# Patient Record
Sex: MALE | Race: BLACK OR AFRICAN AMERICAN | NOT HISPANIC OR LATINO | URBAN - METROPOLITAN AREA
[De-identification: names, ages, dates, MRNs, and addresses within clinical notes are randomized per-mention and may not be internally consistent; named-entity substitution may affect disease eponyms.]

---

## 2018-02-08 NOTE — PATIENT DISCUSSION
Patient understands condition, prognosis and need for follow up care.  B-scan done today to further eval Nevus.

## 2018-02-08 NOTE — PATIENT DISCUSSION
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  No tx rec, poss role for future VF if notice new blind spot or symptoms. B-scan confirmed.

## 2018-03-22 ENCOUNTER — IMPORTED ENCOUNTER (OUTPATIENT)
Dept: URBAN - METROPOLITAN AREA CLINIC 43 | Facility: CLINIC | Age: 83
End: 2018-03-22

## 2018-03-22 PROBLEM — H25.011: Noted: 2018-03-22

## 2018-03-22 PROBLEM — H43.812: Noted: 2018-03-22

## 2018-03-22 PROBLEM — H25.042: Noted: 2018-03-22

## 2018-03-22 PROBLEM — H40.1234: Noted: 2018-03-22

## 2018-03-22 PROBLEM — H35.362: Noted: 2018-03-22

## 2018-03-22 PROBLEM — H25.13: Noted: 2018-03-22

## 2019-04-15 ENCOUNTER — IMPORTED ENCOUNTER (OUTPATIENT)
Dept: URBAN - METROPOLITAN AREA CLINIC 43 | Facility: CLINIC | Age: 84
End: 2019-04-15

## 2019-04-15 PROBLEM — H25.013: Noted: 2019-04-15

## 2019-04-15 PROBLEM — H43.813: Noted: 2019-04-15

## 2019-04-15 PROBLEM — H40.1234: Noted: 2019-04-15

## 2019-04-15 PROBLEM — H25.043: Noted: 2019-04-15

## 2019-08-28 ENCOUNTER — IMPORTED ENCOUNTER (OUTPATIENT)
Dept: URBAN - METROPOLITAN AREA CLINIC 43 | Facility: CLINIC | Age: 84
End: 2019-08-28

## 2019-09-23 ENCOUNTER — IMPORTED ENCOUNTER (OUTPATIENT)
Dept: URBAN - METROPOLITAN AREA CLINIC 43 | Facility: CLINIC | Age: 84
End: 2019-09-23

## 2020-04-18 ASSESSMENT — VISUAL ACUITY
OD_SC: 20/200
OS_CC: J1
OD_OTHER: 20/60.
OS_SC: 20/100
OD_SC: 20/60
OS_CC: J1
OD_CC: 20/40
OS_SC: J16
OS_OTHER: 20/60.
OS_SC: 20/50
OD_CC: 20/40-2
OS_CC: 20/30
OD_SC: J16
OD_CC: J1
OD_OTHER: 20/70.
OS_OTHER: 20/60.
OD_CC: J1
OS_CC: 20/30-2

## 2020-04-18 ASSESSMENT — KERATOMETRY
OD_AXISANGLE2_DEGREES: 100
OS_AXISANGLE_DEGREES: 165
OS_K1POWER_DIOPTERS: 40.5
OS_K2POWER_DIOPTERS: 41.5
OD_K2POWER_DIOPTERS: 41.5
OD_AXISANGLE_DEGREES: 10
OS_AXISANGLE2_DEGREES: 75
OD_K1POWER_DIOPTERS: 40.25

## 2020-04-18 ASSESSMENT — TONOMETRY
OS_IOP_MMHG: 13.0
OD_IOP_MMHG: 11.0
OD_IOP_MMHG: 10.0
OS_IOP_MMHG: 12.0

## 2022-02-04 ENCOUNTER — TELEPHONE (OUTPATIENT)
Dept: SCHEDULING | Age: 87
End: 2022-02-04

## 2022-02-05 ENCOUNTER — OFFICE VISIT (OUTPATIENT)
Dept: URGENT CARE | Age: 87
End: 2022-02-05

## 2022-02-05 VITALS
RESPIRATION RATE: 16 BRPM | DIASTOLIC BLOOD PRESSURE: 68 MMHG | OXYGEN SATURATION: 99 % | HEART RATE: 78 BPM | SYSTOLIC BLOOD PRESSURE: 116 MMHG

## 2022-02-05 DIAGNOSIS — H61.21 EXCESSIVE CERUMEN IN RIGHT EAR CANAL: Primary | ICD-10-CM

## 2022-02-05 PROCEDURE — 69210 REMOVE IMPACTED EAR WAX UNI: CPT | Performed by: NURSE PRACTITIONER

## 2022-02-05 ASSESSMENT — ENCOUNTER SYMPTOMS
HEADACHES: 0
VOMITING: 0
SORE THROAT: 0
DIARRHEA: 0
RHINORRHEA: 0
COUGH: 0
ABDOMINAL PAIN: 0

## 2022-06-04 ENCOUNTER — TELEPHONE ENCOUNTER (OUTPATIENT)
Dept: URBAN - METROPOLITAN AREA CLINIC 68 | Facility: CLINIC | Age: 87
End: 2022-06-04

## 2022-06-04 RX ORDER — AMOXICILLIN 500 MG/1
CAPSULE ORAL
Qty: 40 | Refills: 0 | OUTPATIENT
Start: 2016-05-05 | End: 2016-05-15

## 2022-06-04 RX ORDER — CLARITHROMYCIN 500 MG/1
TABLET, FILM COATED ORAL
Qty: 20 | Refills: 0 | OUTPATIENT
Start: 2016-05-05 | End: 2016-05-15

## 2022-06-05 ENCOUNTER — TELEPHONE ENCOUNTER (OUTPATIENT)
Dept: URBAN - METROPOLITAN AREA CLINIC 68 | Facility: CLINIC | Age: 87
End: 2022-06-05

## 2022-06-05 RX ORDER — EZETIMIBE 10 MG/1
ZETIA( 10MG ORAL  DAILY ) ACTIVE -HX ENTRY TABLET ORAL DAILY
Status: ACTIVE | COMMUNITY
Start: 2016-05-05

## 2022-06-05 RX ORDER — ASPIRIN 81 MG/1
ASPIRIN EC( 81MG ORAL  DAILY ) ACTIVE -HX ENTRY TABLET ORAL DAILY
Status: ACTIVE | COMMUNITY
Start: 2016-05-05

## 2022-06-25 ENCOUNTER — TELEPHONE ENCOUNTER (OUTPATIENT)
Age: 87
End: 2022-06-25

## 2022-06-25 RX ORDER — AMOXICILLIN 500 MG/1
CAPSULE ORAL
Qty: 40 | Refills: 0 | OUTPATIENT
Start: 2016-05-05 | End: 2016-05-15

## 2022-06-25 RX ORDER — CLARITHROMYCIN 500 MG/1
TABLET ORAL
Qty: 20 | Refills: 0 | OUTPATIENT
Start: 2016-05-05 | End: 2016-05-15

## 2022-06-25 RX ORDER — SODIUM SULFATE, POTASSIUM SULFATE, MAGNESIUM SULFATE 17.5; 3.13; 1.6 G/ML; G/ML; G/ML
SOLUTION, CONCENTRATE ORAL AS DIRECTED
Qty: 1 | Refills: 0 | OUTPATIENT
Start: 2016-04-26 | End: 2016-05-05

## 2022-06-26 ENCOUNTER — TELEPHONE ENCOUNTER (OUTPATIENT)
Age: 87
End: 2022-06-26

## 2022-06-26 RX ORDER — EZETIMIBE 10 MG/1
ZETIA( 10MG ORAL  DAILY ) ACTIVE -HX ENTRY TABLET ORAL DAILY
Status: ACTIVE | COMMUNITY
Start: 2016-05-05

## 2022-06-26 RX ORDER — ASPIRIN 81 MG
ASPIRIN EC( 81MG ORAL  DAILY ) ACTIVE -HX ENTRY TABLET, DELAYED RELEASE (ENTERIC COATED) ORAL DAILY
Status: ACTIVE | COMMUNITY
Start: 2016-05-05
